# Patient Record
Sex: FEMALE | Race: WHITE | NOT HISPANIC OR LATINO
[De-identification: names, ages, dates, MRNs, and addresses within clinical notes are randomized per-mention and may not be internally consistent; named-entity substitution may affect disease eponyms.]

---

## 2016-07-14 VITALS — HEIGHT: 63 IN | BODY MASS INDEX: 50.53 KG/M2 | WEIGHT: 285.2 LBS

## 2016-10-06 VITALS — HEIGHT: 63 IN | BODY MASS INDEX: 49.26 KG/M2 | WEIGHT: 278 LBS

## 2016-10-27 VITALS — BODY MASS INDEX: 46.25 KG/M2 | HEIGHT: 63 IN | WEIGHT: 261 LBS

## 2017-01-05 VITALS — WEIGHT: 229.2 LBS | BODY MASS INDEX: 40.61 KG/M2 | HEIGHT: 63 IN

## 2018-05-23 ENCOUNTER — RECORD ABSTRACTING (OUTPATIENT)
Age: 63
End: 2018-05-23

## 2018-05-23 DIAGNOSIS — Z92.89 PERSONAL HISTORY OF OTHER MEDICAL TREATMENT: ICD-10-CM

## 2018-05-23 DIAGNOSIS — Z80.3 FAMILY HISTORY OF MALIGNANT NEOPLASM OF BREAST: ICD-10-CM

## 2018-05-23 DIAGNOSIS — M17.10 UNILATERAL PRIMARY OSTEOARTHRITIS, UNSPECIFIED KNEE: ICD-10-CM

## 2018-05-23 DIAGNOSIS — E61.1 IRON DEFICIENCY: ICD-10-CM

## 2018-05-23 DIAGNOSIS — Z82.49 FAMILY HISTORY OF ISCHEMIC HEART DISEASE AND OTHER DISEASES OF THE CIRCULATORY SYSTEM: ICD-10-CM

## 2018-05-23 DIAGNOSIS — R79.89 OTHER SPECIFIED ABNORMAL FINDINGS OF BLOOD CHEMISTRY: ICD-10-CM

## 2018-05-23 DIAGNOSIS — M54.5 LOW BACK PAIN: ICD-10-CM

## 2018-05-23 DIAGNOSIS — Z78.9 OTHER SPECIFIED HEALTH STATUS: ICD-10-CM

## 2018-05-23 DIAGNOSIS — Z86.39 PERSONAL HISTORY OF OTHER ENDOCRINE, NUTRITIONAL AND METABOLIC DISEASE: ICD-10-CM

## 2018-05-23 DIAGNOSIS — I10 ESSENTIAL (PRIMARY) HYPERTENSION: ICD-10-CM

## 2018-05-23 DIAGNOSIS — Z87.891 PERSONAL HISTORY OF NICOTINE DEPENDENCE: ICD-10-CM

## 2018-05-23 RX ORDER — LOSARTAN POTASSIUM AND HYDROCHLOROTHIAZIDE 12.5; 1 MG/1; MG/1
TABLET ORAL
Refills: 0 | Status: ACTIVE | COMMUNITY

## 2018-05-23 RX ORDER — IRON/IRON ASP GLY/FA/MV-MIN 38 125-25-1MG
TABLET ORAL
Refills: 0 | Status: ACTIVE | COMMUNITY

## 2018-05-23 RX ORDER — PNV NO.95/FERROUS FUM/FOLIC AC 28MG-0.8MG
TABLET ORAL
Refills: 0 | Status: ACTIVE | COMMUNITY

## 2018-05-23 RX ORDER — CHOLECALCIFEROL (VITAMIN D3) 25 MCG
TABLET ORAL
Refills: 0 | Status: ACTIVE | COMMUNITY

## 2018-05-23 RX ORDER — PSYLLIUM HUSK 0.4 G
CAPSULE ORAL
Refills: 0 | Status: ACTIVE | COMMUNITY

## 2018-05-31 ENCOUNTER — APPOINTMENT (OUTPATIENT)
Dept: BARIATRICS | Facility: CLINIC | Age: 63
End: 2018-05-31
Payer: COMMERCIAL

## 2018-05-31 VITALS
BODY MASS INDEX: 35.93 KG/M2 | HEIGHT: 63 IN | DIASTOLIC BLOOD PRESSURE: 77 MMHG | HEART RATE: 78 BPM | SYSTOLIC BLOOD PRESSURE: 137 MMHG | WEIGHT: 202.8 LBS

## 2018-05-31 DIAGNOSIS — E66.9 OBESITY, UNSPECIFIED: ICD-10-CM

## 2018-05-31 PROCEDURE — 99213 OFFICE O/P EST LOW 20 MIN: CPT

## 2018-07-19 ENCOUNTER — APPOINTMENT (OUTPATIENT)
Dept: BARIATRICS | Facility: CLINIC | Age: 63
End: 2018-07-19

## 2018-10-01 PROBLEM — R79.89 LOW SERUM PREALBUMIN: Status: RESOLVED | Noted: 2018-05-23 | Resolved: 2018-10-01

## 2018-10-04 ENCOUNTER — APPOINTMENT (OUTPATIENT)
Dept: BARIATRICS | Facility: CLINIC | Age: 63
End: 2018-10-04

## 2019-10-03 ENCOUNTER — APPOINTMENT (OUTPATIENT)
Dept: BARIATRICS | Facility: CLINIC | Age: 64
End: 2019-10-03
Payer: COMMERCIAL

## 2019-10-03 VITALS
HEART RATE: 73 BPM | BODY MASS INDEX: 43.98 KG/M2 | WEIGHT: 224 LBS | DIASTOLIC BLOOD PRESSURE: 84 MMHG | SYSTOLIC BLOOD PRESSURE: 125 MMHG | HEIGHT: 60 IN

## 2019-10-03 PROCEDURE — 99213 OFFICE O/P EST LOW 20 MIN: CPT

## 2019-10-03 RX ORDER — CHOLESTYRAMINE 4 G/9G
4 POWDER, FOR SUSPENSION ORAL 3 TIMES DAILY
Qty: 90 | Refills: 2 | Status: ACTIVE | COMMUNITY
Start: 2019-10-03 | End: 1900-01-01

## 2019-10-22 ENCOUNTER — CLINICAL ADVICE (OUTPATIENT)
Age: 64
End: 2019-10-22

## 2019-11-18 ENCOUNTER — APPOINTMENT (OUTPATIENT)
Dept: BARIATRICS | Facility: CLINIC | Age: 64
End: 2019-11-18
Payer: COMMERCIAL

## 2019-11-18 PROCEDURE — 97803 MED NUTRITION INDIV SUBSEQ: CPT

## 2019-11-20 ENCOUNTER — OTHER (OUTPATIENT)
Age: 64
End: 2019-11-20

## 2019-11-22 ENCOUNTER — APPOINTMENT (OUTPATIENT)
Dept: BARIATRICS | Facility: CLINIC | Age: 64
End: 2019-11-22
Payer: COMMERCIAL

## 2019-11-22 VITALS
DIASTOLIC BLOOD PRESSURE: 85 MMHG | HEIGHT: 60 IN | SYSTOLIC BLOOD PRESSURE: 146 MMHG | BODY MASS INDEX: 43.59 KG/M2 | HEART RATE: 71 BPM | WEIGHT: 222 LBS

## 2019-11-22 PROCEDURE — 99213 OFFICE O/P EST LOW 20 MIN: CPT

## 2019-11-22 RX ORDER — METRONIDAZOLE 250 MG/1
250 TABLET ORAL EVERY 6 HOURS
Qty: 28 | Refills: 0 | Status: ACTIVE | COMMUNITY
Start: 2019-11-22 | End: 1900-01-01

## 2020-01-27 ENCOUNTER — CLINICAL ADVICE (OUTPATIENT)
Age: 65
End: 2020-01-27

## 2020-03-10 DIAGNOSIS — E66.01 MORBID (SEVERE) OBESITY DUE TO EXCESS CALORIES: ICD-10-CM

## 2023-02-16 ENCOUNTER — APPOINTMENT (OUTPATIENT)
Dept: BARIATRICS/WEIGHT MGMT | Facility: CLINIC | Age: 68
End: 2023-02-16
Payer: COMMERCIAL

## 2023-02-16 ENCOUNTER — TRANSCRIPTION ENCOUNTER (OUTPATIENT)
Age: 68
End: 2023-02-16

## 2023-02-16 VITALS
WEIGHT: 224 LBS | DIASTOLIC BLOOD PRESSURE: 80 MMHG | SYSTOLIC BLOOD PRESSURE: 115 MMHG | BODY MASS INDEX: 40.19 KG/M2 | HEIGHT: 62.5 IN

## 2023-02-16 PROCEDURE — 99205 OFFICE O/P NEW HI 60 MIN: CPT

## 2023-02-16 NOTE — ASSESSMENT
[FreeTextEntry1] : Dietary Modification Education provided. Recommend a diet high in vegetables intake & lean protein. Recommend eating complex carbs instead of simple carbs. Discussed healthier snack options including vegetables with hummus or avocado \par Physical Activity- recommend aerobic exercise 3-4 times per week for 30-45 mins, recommend incorporating strength training 3 times per week \par Labs- ordered HgBA1c\par Medication- pt meets criteria to initiate medication treatment of obesity. May benefit from qsymia or wegovy. Will send PA for wegovy to see if she has coverage \par Rhythm genetic test- consent obtained and test sent \par RTO in 2-4 weeks for lab review\par \par

## 2023-02-16 NOTE — HISTORY OF PRESENT ILLNESS
[FreeTextEntry1] : 66 y/o F here for initial Medical weight management consultation, has hx of lap band and then removal with DS\par \par Medical Hx: Morbid obesity, fatty liver, cholelithiasis, HTN, HLD, Arthritis of knee, Vertigo \par Surgical Hx: Duodenal Switch, hysterectomy, c/section, hernia repair\par \par Started struggling with weight since she was a child, has always felt insatiable. Continues to struggle with constant hunger throughout the day. Eats every 2 hours, but sometimes cannot wait that long\par  Gained 25 lbs since 2017 \par Past Wt Loss Attempts:\par Highest Adult Wt: 300 lbs, Lowest Adult Wt: 189 lbs, \par Goal:\par Food Recall: B- premiere protein, green tea with lemon and honey, greek yogurt with bb and honey, peppers, crackers and cheese, \par Today- B- protein shake, tea, egg and sausage, L- 1 hot dog for lunch, 1 celeste kiss\par Reports grazing throughout the day \par Water Intake: 5 (17 ounce) water bottles, plus tea \par Exercise Regimen: 7,000-9,000 steps per day 5 days per week \par Sleep:8:50-4:30, + snoring , denies headaches or feeling tired in the morning \par Stress Level:low \par Gyn:+ post menopause \par Social Hx: , works 2 jobs daily, takes care of her mother on Saturday, social alcohol, denies smoking/illicit drug use\par \par \par

## 2023-02-28 LAB
ALBUMIN SERPL ELPH-MCNC: 4.2 G/DL
ALP BLD-CCNC: 144 U/L
ALT SERPL-CCNC: 42 U/L
ANION GAP SERPL CALC-SCNC: 15 MMOL/L
AST SERPL-CCNC: 37 U/L
BILIRUB SERPL-MCNC: 0.2 MG/DL
BUN SERPL-MCNC: 15 MG/DL
CALCIUM SERPL-MCNC: 9.3 MG/DL
CHLORIDE SERPL-SCNC: 106 MMOL/L
CO2 SERPL-SCNC: 19 MMOL/L
CREAT SERPL-MCNC: 0.4 MG/DL
EGFR: 109 ML/MIN/1.73M2
ESTIMATED AVERAGE GLUCOSE: 103 MG/DL
GLUCOSE SERPL-MCNC: 98 MG/DL
HBA1C MFR BLD HPLC: 5.2 %
POTASSIUM SERPL-SCNC: 3.9 MMOL/L
PROT SERPL-MCNC: 6.4 G/DL
SODIUM SERPL-SCNC: 139 MMOL/L

## 2023-03-07 ENCOUNTER — APPOINTMENT (OUTPATIENT)
Dept: BARIATRICS/WEIGHT MGMT | Facility: CLINIC | Age: 68
End: 2023-03-07
Payer: COMMERCIAL

## 2023-03-07 VITALS — BODY MASS INDEX: 40.79 KG/M2 | WEIGHT: 226.6 LBS

## 2023-03-07 DIAGNOSIS — E66.01 MORBID (SEVERE) OBESITY DUE TO EXCESS CALORIES: ICD-10-CM

## 2023-03-07 DIAGNOSIS — Z98.84 BARIATRIC SURGERY STATUS: ICD-10-CM

## 2023-03-07 PROCEDURE — 99212 OFFICE O/P EST SF 10 MIN: CPT

## 2023-03-07 RX ORDER — SEMAGLUTIDE 0.25 MG/.5ML
0.25 INJECTION, SOLUTION SUBCUTANEOUS
Qty: 1 | Refills: 0 | Status: ACTIVE | COMMUNITY
Start: 2023-03-07 | End: 1900-01-01

## 2023-03-07 NOTE — HISTORY OF PRESENT ILLNESS
[FreeTextEntry1] : 68 y/o F here for initial Medical weight management consultation, has hx of lap band and then removal with DS\par Medical Hx: Morbid obesity, fatty liver, cholelithiasis, HTN, HLD, Arthritis of knee, Vertigo \par Surgical Hx: Duodenal Switch, hysterectomy, c/section, hernia repair\par Started struggling with weight since she was a child, has always felt insatiable. Continues to struggle with constant hunger throughout the day. Eats every 2 hours, but sometimes cannot wait that long\par  Gained 25 lbs since 2017 \par Past Wt Loss Attempts:\par Highest Adult Wt: 300 lbs, Lowest Adult Wt: 189 lbs\par Food Recall: B- premiere protein, green tea with lemon and honey, greek yogurt with bb and honey, peppers, crackers and cheese, \par Today- B- protein shake, tea, egg and sausage, L- 1 hot dog for lunch, 1 celeste kiss\par Reports grazing throughout the day \par Water Intake: 5 (17 ounce) water bottles, plus tea \par Exercise Regimen: 7,000-9,000 steps per day 5 days per week \par Sleep:8:50-4:30, + snoring , denies headaches or feeling tired in the morning \par Stress Level:low \par Gyn:+ post menopause \par Social Hx: , works 2 jobs daily, takes care of her mother on Saturday, social alcohol, denies smoking/illicit drug use\par \par 3/7/23: Feels stressed that she needs to get to her next job on time. Tried to make dietary changes but continues to struggle with hunger and cravings throughout the day. Rhythm genetic testing pending. Reviewed recent lab results with her- HGBA1c WNl, LFTs elevated. Gained 2 lbs. \par \par 3/7/23: \par

## 2023-03-07 NOTE — ASSESSMENT
[FreeTextEntry1] : Continue to focus on lifestyle modification as instructed last visit\par Rhythm Genetic testing pending\par Medication- sent Rx for Wegovy but unable to discuss education needed to start medication. Instructed pt to discuss medication further before starting any new medication, waiting to hear from ins if she has coverage for this medicine\par RTO in 2-4 weeks

## 2023-06-13 ENCOUNTER — TRANSCRIPTION ENCOUNTER (OUTPATIENT)
Age: 68
End: 2023-06-13

## 2023-07-20 ENCOUNTER — APPOINTMENT (OUTPATIENT)
Dept: BARIATRICS/WEIGHT MGMT | Facility: CLINIC | Age: 68
End: 2023-07-20

## 2024-09-10 NOTE — CONSULT LETTER
[Dear  ___] : Dear  [unfilled], [Consult Letter:] : I had the pleasure of evaluating your patient, [unfilled]. [Please see my note below.] : Please see my note below. [Consult Closing:] : Thank you very much for allowing me to participate in the care of this patient.  If you have any questions, please do not hesitate to contact me. [Sincerely,] : Sincerely, [FreeTextEntry3] : Erica Borja, NP  [phone]

## 2025-01-17 ENCOUNTER — TRANSCRIPTION ENCOUNTER (OUTPATIENT)
Age: 70
End: 2025-01-17

## 2025-01-17 ENCOUNTER — APPOINTMENT (OUTPATIENT)
Dept: BARIATRICS | Facility: CLINIC | Age: 70
End: 2025-01-17

## 2025-01-17 PROCEDURE — 97803 MED NUTRITION INDIV SUBSEQ: CPT
